# Patient Record
Sex: MALE | Race: OTHER | HISPANIC OR LATINO | URBAN - METROPOLITAN AREA
[De-identification: names, ages, dates, MRNs, and addresses within clinical notes are randomized per-mention and may not be internally consistent; named-entity substitution may affect disease eponyms.]

---

## 2017-04-21 ENCOUNTER — EMERGENCY (EMERGENCY)
Facility: HOSPITAL | Age: 62
LOS: 0 days | Discharge: HOME | End: 2017-04-21
Attending: EMERGENCY MEDICINE
Payer: MEDICARE

## 2017-04-21 VITALS
HEIGHT: 65 IN | TEMPERATURE: 98 F | RESPIRATION RATE: 20 BRPM | DIASTOLIC BLOOD PRESSURE: 81 MMHG | SYSTOLIC BLOOD PRESSURE: 134 MMHG | WEIGHT: 154.98 LBS | HEART RATE: 103 BPM | OXYGEN SATURATION: 100 %

## 2017-04-21 VITALS
DIASTOLIC BLOOD PRESSURE: 68 MMHG | HEART RATE: 82 BPM | RESPIRATION RATE: 19 BRPM | TEMPERATURE: 98 F | OXYGEN SATURATION: 100 % | SYSTOLIC BLOOD PRESSURE: 121 MMHG

## 2017-04-21 DIAGNOSIS — M10.9 GOUT, UNSPECIFIED: ICD-10-CM

## 2017-04-21 DIAGNOSIS — M79.673 PAIN IN UNSPECIFIED FOOT: ICD-10-CM

## 2017-04-21 PROCEDURE — 99283 EMERGENCY DEPT VISIT LOW MDM: CPT

## 2017-04-21 RX ADMIN — Medication 500 MILLIGRAM(S): at 12:53

## 2017-04-21 NOTE — ED ADULT NURSE NOTE - OBJECTIVE STATEMENT
patient c/o of R knee pain and L foot and L hand pain started 14 days ago , denied chest pain no difficulty breathing

## 2017-04-21 NOTE — ED PROVIDER NOTE - OBJECTIVE STATEMENT
Pt is a 60 yo gentleman with a pmhx of Pt is a 62 yo gentleman with a pmhx of gout, HTN, HL who presents to the ED with R. knee pain and L. 1st toe pain consistent w his usual gout pain. Started 2 weeks ago. No fevers, no trauama. Has swelling of the 2 joints. Has not been compliant with diet which he usually is, had a barbeque 2 weeks ago prior to start of pain.

## 2017-04-21 NOTE — ED ADULT TRIAGE NOTE - CHIEF COMPLAINT QUOTE
Swelling/pain to right knee, redness/swelling/pain to left ankle , pain to left fore arm . onset : "few weeks"  denies trauma

## 2018-08-01 ENCOUNTER — INPATIENT (INPATIENT)
Facility: HOSPITAL | Age: 63
LOS: 1 days | Discharge: HOME | End: 2018-08-03
Attending: INTERNAL MEDICINE | Admitting: INTERNAL MEDICINE
Payer: MEDICARE

## 2018-08-01 VITALS
HEIGHT: 66 IN | WEIGHT: 145.06 LBS | DIASTOLIC BLOOD PRESSURE: 70 MMHG | TEMPERATURE: 98 F | SYSTOLIC BLOOD PRESSURE: 148 MMHG | RESPIRATION RATE: 18 BRPM | OXYGEN SATURATION: 99 % | HEART RATE: 72 BPM

## 2018-08-01 DIAGNOSIS — I10 ESSENTIAL (PRIMARY) HYPERTENSION: ICD-10-CM

## 2018-08-01 DIAGNOSIS — L03.114 CELLULITIS OF LEFT UPPER LIMB: ICD-10-CM

## 2018-08-01 DIAGNOSIS — S49.90XA UNSPECIFIED INJURY OF SHOULDER AND UPPER ARM, UNSPECIFIED ARM, INITIAL ENCOUNTER: Chronic | ICD-10-CM

## 2018-08-01 DIAGNOSIS — E78.2 MIXED HYPERLIPIDEMIA: ICD-10-CM

## 2018-08-01 LAB
ALBUMIN SERPL ELPH-MCNC: 3.6 G/DL — SIGNIFICANT CHANGE UP (ref 3.3–5)
ALP SERPL-CCNC: 70 U/L — SIGNIFICANT CHANGE UP (ref 40–120)
ALT FLD-CCNC: 23 U/L — SIGNIFICANT CHANGE UP (ref 12–78)
ANION GAP SERPL CALC-SCNC: 10 MMOL/L — SIGNIFICANT CHANGE UP (ref 5–17)
AST SERPL-CCNC: 18 U/L — SIGNIFICANT CHANGE UP (ref 15–37)
BASOPHILS # BLD AUTO: 0.02 K/UL — SIGNIFICANT CHANGE UP (ref 0–0.2)
BASOPHILS NFR BLD AUTO: 0.2 % — SIGNIFICANT CHANGE UP (ref 0–2)
BILIRUB SERPL-MCNC: 0.7 MG/DL — SIGNIFICANT CHANGE UP (ref 0.2–1.2)
BUN SERPL-MCNC: 15 MG/DL — SIGNIFICANT CHANGE UP (ref 7–23)
CALCIUM SERPL-MCNC: 9 MG/DL — SIGNIFICANT CHANGE UP (ref 8.5–10.1)
CHLORIDE SERPL-SCNC: 103 MMOL/L — SIGNIFICANT CHANGE UP (ref 96–108)
CO2 SERPL-SCNC: 29 MMOL/L — SIGNIFICANT CHANGE UP (ref 22–31)
CREAT SERPL-MCNC: 1.2 MG/DL — SIGNIFICANT CHANGE UP (ref 0.5–1.3)
EOSINOPHIL # BLD AUTO: 0 K/UL — SIGNIFICANT CHANGE UP (ref 0–0.5)
EOSINOPHIL NFR BLD AUTO: 0 % — SIGNIFICANT CHANGE UP (ref 0–6)
ERYTHROCYTE [SEDIMENTATION RATE] IN BLOOD: 39 MM/HR — HIGH (ref 0–20)
GLUCOSE SERPL-MCNC: 109 MG/DL — HIGH (ref 70–99)
HCT VFR BLD CALC: 40.7 % — SIGNIFICANT CHANGE UP (ref 39–50)
HGB BLD-MCNC: 13.5 G/DL — SIGNIFICANT CHANGE UP (ref 13–17)
IMM GRANULOCYTES NFR BLD AUTO: 0.3 % — SIGNIFICANT CHANGE UP (ref 0–1.5)
LACTATE SERPL-SCNC: 1.8 MMOL/L — SIGNIFICANT CHANGE UP (ref 0.7–2)
LYMPHOCYTES # BLD AUTO: 1.82 K/UL — SIGNIFICANT CHANGE UP (ref 1–3.3)
LYMPHOCYTES # BLD AUTO: 14.7 % — SIGNIFICANT CHANGE UP (ref 13–44)
MAGNESIUM SERPL-MCNC: 2.6 MG/DL — SIGNIFICANT CHANGE UP (ref 1.6–2.6)
MCHC RBC-ENTMCNC: 30.9 PG — SIGNIFICANT CHANGE UP (ref 27–34)
MCHC RBC-ENTMCNC: 33.2 GM/DL — SIGNIFICANT CHANGE UP (ref 32–36)
MCV RBC AUTO: 93.1 FL — SIGNIFICANT CHANGE UP (ref 80–100)
MONOCYTES # BLD AUTO: 1.11 K/UL — HIGH (ref 0–0.9)
MONOCYTES NFR BLD AUTO: 9 % — SIGNIFICANT CHANGE UP (ref 2–14)
NEUTROPHILS # BLD AUTO: 9.37 K/UL — HIGH (ref 1.8–7.4)
NEUTROPHILS NFR BLD AUTO: 75.8 % — SIGNIFICANT CHANGE UP (ref 43–77)
NRBC # BLD: 0 /100 WBCS — SIGNIFICANT CHANGE UP (ref 0–0)
PLATELET # BLD AUTO: 227 K/UL — SIGNIFICANT CHANGE UP (ref 150–400)
POTASSIUM SERPL-MCNC: 3.6 MMOL/L — SIGNIFICANT CHANGE UP (ref 3.5–5.3)
POTASSIUM SERPL-SCNC: 3.6 MMOL/L — SIGNIFICANT CHANGE UP (ref 3.5–5.3)
PROT SERPL-MCNC: 8 GM/DL — SIGNIFICANT CHANGE UP (ref 6–8.3)
RBC # BLD: 4.37 M/UL — SIGNIFICANT CHANGE UP (ref 4.2–5.8)
RBC # FLD: 14.4 % — SIGNIFICANT CHANGE UP (ref 10.3–14.5)
SODIUM SERPL-SCNC: 142 MMOL/L — SIGNIFICANT CHANGE UP (ref 135–145)
WBC # BLD: 12.36 K/UL — HIGH (ref 3.8–10.5)
WBC # FLD AUTO: 12.36 K/UL — HIGH (ref 3.8–10.5)

## 2018-08-01 PROCEDURE — 73110 X-RAY EXAM OF WRIST: CPT | Mod: 26,LT

## 2018-08-01 PROCEDURE — 99223 1ST HOSP IP/OBS HIGH 75: CPT

## 2018-08-01 PROCEDURE — 73130 X-RAY EXAM OF HAND: CPT | Mod: 26,LT

## 2018-08-01 PROCEDURE — 71045 X-RAY EXAM CHEST 1 VIEW: CPT | Mod: 26

## 2018-08-01 PROCEDURE — 99285 EMERGENCY DEPT VISIT HI MDM: CPT

## 2018-08-01 RX ORDER — ATORVASTATIN CALCIUM 80 MG/1
0 TABLET, FILM COATED ORAL
Qty: 0 | Refills: 0 | COMMUNITY

## 2018-08-01 RX ORDER — AMPICILLIN SODIUM AND SULBACTAM SODIUM 250; 125 MG/ML; MG/ML
1.5 INJECTION, POWDER, FOR SUSPENSION INTRAMUSCULAR; INTRAVENOUS EVERY 6 HOURS
Qty: 0 | Refills: 0 | Status: DISCONTINUED | OUTPATIENT
Start: 2018-08-01 | End: 2018-08-03

## 2018-08-01 RX ORDER — AMPICILLIN SODIUM AND SULBACTAM SODIUM 250; 125 MG/ML; MG/ML
3 INJECTION, POWDER, FOR SUSPENSION INTRAMUSCULAR; INTRAVENOUS ONCE
Qty: 0 | Refills: 0 | Status: COMPLETED | OUTPATIENT
Start: 2018-08-01 | End: 2018-08-01

## 2018-08-01 RX ORDER — MORPHINE SULFATE 50 MG/1
4 CAPSULE, EXTENDED RELEASE ORAL ONCE
Qty: 0 | Refills: 0 | Status: DISCONTINUED | OUTPATIENT
Start: 2018-08-01 | End: 2018-08-01

## 2018-08-01 RX ORDER — MORPHINE SULFATE 50 MG/1
2 CAPSULE, EXTENDED RELEASE ORAL EVERY 4 HOURS
Qty: 0 | Refills: 0 | Status: DISCONTINUED | OUTPATIENT
Start: 2018-08-01 | End: 2018-08-03

## 2018-08-01 RX ORDER — ATORVASTATIN CALCIUM 80 MG/1
40 TABLET, FILM COATED ORAL AT BEDTIME
Qty: 0 | Refills: 0 | Status: DISCONTINUED | OUTPATIENT
Start: 2018-08-01 | End: 2018-08-03

## 2018-08-01 RX ORDER — HYDROCHLOROTHIAZIDE 25 MG
12.5 TABLET ORAL DAILY
Qty: 0 | Refills: 0 | Status: DISCONTINUED | OUTPATIENT
Start: 2018-08-01 | End: 2018-08-03

## 2018-08-01 RX ADMIN — MORPHINE SULFATE 4 MILLIGRAM(S): 50 CAPSULE, EXTENDED RELEASE ORAL at 16:58

## 2018-08-01 RX ADMIN — ATORVASTATIN CALCIUM 40 MILLIGRAM(S): 80 TABLET, FILM COATED ORAL at 22:53

## 2018-08-01 RX ADMIN — AMPICILLIN SODIUM AND SULBACTAM SODIUM 100 GRAM(S): 250; 125 INJECTION, POWDER, FOR SUSPENSION INTRAMUSCULAR; INTRAVENOUS at 22:53

## 2018-08-01 RX ADMIN — AMPICILLIN SODIUM AND SULBACTAM SODIUM 3 GRAM(S): 250; 125 INJECTION, POWDER, FOR SUSPENSION INTRAMUSCULAR; INTRAVENOUS at 16:58

## 2018-08-01 RX ADMIN — MORPHINE SULFATE 4 MILLIGRAM(S): 50 CAPSULE, EXTENDED RELEASE ORAL at 16:27

## 2018-08-01 RX ADMIN — AMPICILLIN SODIUM AND SULBACTAM SODIUM 200 GRAM(S): 250; 125 INJECTION, POWDER, FOR SUSPENSION INTRAMUSCULAR; INTRAVENOUS at 16:28

## 2018-08-01 NOTE — ED ADULT TRIAGE NOTE - CHIEF COMPLAINT QUOTE
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 denies trauma.

## 2018-08-01 NOTE — H&P ADULT - ASSESSMENT
62f with history of recent orif with pain       IMPROVE VTE Individual Risk Assessment          RISK                                                          Points    [  ] Previous VTE                                                3    [  ] Thrombophilia                                             2    [  ] Lower limb paralysis                                    2        (unable to hold up >15 seconds)      [  ] Current Cancer                                             2         (within 6 months)    [  ] Immobilization > 24 hrs                              1    [  ] ICU/CCU stay > 24 hours                            1    [  ] Age > 60                                                    1    IMPROVE VTE Score ______0___

## 2018-08-01 NOTE — H&P ADULT - NSHPREVIEWOFSYSTEMS_GEN_ALL_CORE

## 2018-08-01 NOTE — ED PROVIDER NOTE - MEDICAL DECISION MAKING DETAILS
left hand and wrist cellulitis. Will start on IV abx and admit for resolution of infection. No signs of sepsis at this time.

## 2018-08-01 NOTE — H&P ADULT - NSHPPHYSICALEXAM_GEN_ALL_CORE
GENERAL: NAD well-developed  HEAD:  Atraumatic, Normocephalic  EYES: EOMI, PERRLA, conjunctiva and sclera clear  ENMT: No tonsillar erythema, exudates, or enlargement; Moist mucous membranes, Good dentition, No lesions  NECK: Supple, No JVD, Normal thyroid  NERVOUS SYSTEM:  Alert & Oriented X3, Good concentration; Motor Strength 5/5 B/L upper and lower extremities; DTRs 2+ intact and symmetric  CHEST/LUNG: Clear to percussion bilaterally; No rales, rhonchi, wheezing, or rubs  HEART: Regular rate and rhythm; No murmurs, rubs, or gallops  ABDOMEN: Soft, Nontender, Nondistended; Bowel sounds present  EXTREMITIES:  2+ Peripheral Pulses, No clubbing, cyanosis, or edema  LYMPH: No lymphadenopathy   SKIN: No rashes or lesions GENERAL: NAD well-developed  HEAD:  Atraumatic, Normocephalic  EYES: EOMI, PERRLA, conjunctiva and sclera clear  ENMT: No tonsillar erythema, exudates, or enlargement; Moist mucous membranes, Good dentition, No lesions  NECK: Supple, No JVD, Normal thyroid  NERVOUS SYSTEM:  Alert & Oriented X3, Good concentration; Motor Strength 5/5 B/L upper and lower extremities; DTRs 2+ intact and symmetric  CHEST/LUNG: Clear to percussion bilaterally; No rales, rhonchi, wheezing, or rubs  HEART: Regular rate and rhythm; No murmurs, rubs, or gallops  ABDOMEN: Soft, Nontender, Nondistended; Bowel sounds present  EXTREMITIES:  left hand erythema swelling and warmth to left hand with decreased range of motion status post left wrist orif   LYMPH: No lymphadenopathy   SKIN: No rashes or lesions

## 2018-08-01 NOTE — H&P ADULT - HISTORY OF PRESENT ILLNESS
62m hx of left wrist ORIF 6 years ago and htn pw 2 days of left wrist and hand pain with associated swelling. Patient denies trauma, nausea, vomiting, fever or chills. He did not take anything for symptoms but they gradually worsened over the past 2 days. He went to an urgent care and was told to come in for xrays. Patient has no cp, sob, rash, bleeding, abd pain, ha, vision change, or numbness

## 2018-08-01 NOTE — ED ADULT NURSE NOTE - NSIMPLEMENTINTERV_GEN_ALL_ED
Implemented All Universal Safety Interventions:  [place] [employer] to call system. Call bell, personal items and telephone within reach. Instruct patient to call for assistance. Room bathroom lighting operational. Non-slip footwear when patient is off stretcher. Physically safe environment: no spills, clutter or unnecessary equipment. Stretcher in lowest position, wheels locked, appropriate side rails in place.

## 2018-08-01 NOTE — ED PROVIDER NOTE - OBJECTIVE STATEMENT
Pertinent PMH/PSH/FHx/SHx and Review of Systems contained within:  62m hx of left wrist ORIF 6 years ago and htn pw 2 days of left wrist and hand pain with associated swelling. Patient denies trauma, nausea, vomiting, fever or chills. He did not take anything for symptoms but they gradually worsened over the past 2 days. He went to an urgent care and was told to come in for xrays. Patient has no cp, sob, rash, bleeding, abd pain, ha, vision change, or numbness  Fh and Sh not otherwise contributory  ROS otherwise negative

## 2018-08-01 NOTE — H&P ADULT - NSHPLABSRESULTS_GEN_ALL_CORE
13.5   12.36 )-----------( 227      ( 01 Aug 2018 16:23 )             40.7   08-01    142  |  103  |  15  ----------------------------<  109<H>  3.6   |  29  |  1.20    Ca    9.0      01 Aug 2018 16:23  Mg     2.6     08-01    TPro  8.0  /  Alb  3.6  /  TBili  0.7  /  DBili  x   /  AST  18  /  ALT  23  /  AlkPhos  70  08-01  < from: Xray Hand 3 Views, Left (08.01.18 @ 16:21) >      IMPRESSION: No acute finding. Other findings as above.    < from: Xray Chest 1 View AP/PA. (08.01.18 @ 17:19) >    IMPRESSION: Negative ches    < from: Xray Wrist 3 Views, Left (08.01.18 @ 16:21) >    here is a small orthopedic staple seen around the dorsal navicular area   which is new since November 30, 2012. The 2012 study showed some slight   scapholunate widening which is unchanged.    No bone destruction or acute fracture is evident.    < end of copied text >

## 2018-08-01 NOTE — ED PROVIDER NOTE - PHYSICAL EXAMINATION
Gen: Alert, NAD  Head: NC, AT   Eyes: PERRL, EOMI, normal lids/conjunctiva  ENT: normal hearing, patent oropharynx without erythema/exudate, uvula midline  Neck: supple, no tenderness, Trachea midline  Pulm: Bilateral BS, normal resp effort, no wheeze/stridor/retractions  CV: RRR, no M/R/G, 2+ radial and dp pulses bl  Abd: soft, NT/ND, +BS, no hepatosplenomegaly  Mskel: left hand soft tissue swelling with erythema extending from distal forearm to mid hand. it is tender to palpation and there is pain with ranging   Skin: erythema of the left hand and wrist  Neuro: AAOx3, no sensory/motor deficits, CN 2-12 intact

## 2018-08-02 LAB
CRP SERPL-MCNC: 19.81 MG/DL — HIGH (ref 0–0.4)
URATE SERPL-MCNC: 6.6 MG/DL — SIGNIFICANT CHANGE UP (ref 3.4–8.8)

## 2018-08-02 PROCEDURE — 99223 1ST HOSP IP/OBS HIGH 75: CPT

## 2018-08-02 PROCEDURE — 73610 X-RAY EXAM OF ANKLE: CPT | Mod: 26,LT

## 2018-08-02 PROCEDURE — 93010 ELECTROCARDIOGRAM REPORT: CPT

## 2018-08-02 PROCEDURE — 99233 SBSQ HOSP IP/OBS HIGH 50: CPT

## 2018-08-02 RX ORDER — POLYETHYLENE GLYCOL 3350 17 G/17G
17 POWDER, FOR SOLUTION ORAL DAILY
Qty: 0 | Refills: 0 | Status: DISCONTINUED | OUTPATIENT
Start: 2018-08-02 | End: 2018-08-03

## 2018-08-02 RX ORDER — DOCUSATE SODIUM 100 MG
100 CAPSULE ORAL
Qty: 0 | Refills: 0 | Status: DISCONTINUED | OUTPATIENT
Start: 2018-08-02 | End: 2018-08-03

## 2018-08-02 RX ORDER — COLCHICINE 0.6 MG
1.2 TABLET ORAL ONCE
Qty: 0 | Refills: 0 | Status: COMPLETED | OUTPATIENT
Start: 2018-08-02 | End: 2018-08-02

## 2018-08-02 RX ORDER — SENNA PLUS 8.6 MG/1
2 TABLET ORAL AT BEDTIME
Qty: 0 | Refills: 0 | Status: DISCONTINUED | OUTPATIENT
Start: 2018-08-02 | End: 2018-08-03

## 2018-08-02 RX ORDER — ACETAMINOPHEN 500 MG
650 TABLET ORAL EVERY 6 HOURS
Qty: 0 | Refills: 0 | Status: DISCONTINUED | OUTPATIENT
Start: 2018-08-02 | End: 2018-08-03

## 2018-08-02 RX ADMIN — AMPICILLIN SODIUM AND SULBACTAM SODIUM 100 GRAM(S): 250; 125 INJECTION, POWDER, FOR SUSPENSION INTRAMUSCULAR; INTRAVENOUS at 10:51

## 2018-08-02 RX ADMIN — Medication 650 MILLIGRAM(S): at 18:09

## 2018-08-02 RX ADMIN — Medication 12.5 MILLIGRAM(S): at 06:02

## 2018-08-02 RX ADMIN — SENNA PLUS 2 TABLET(S): 8.6 TABLET ORAL at 22:11

## 2018-08-02 RX ADMIN — AMPICILLIN SODIUM AND SULBACTAM SODIUM 100 GRAM(S): 250; 125 INJECTION, POWDER, FOR SUSPENSION INTRAMUSCULAR; INTRAVENOUS at 16:30

## 2018-08-02 RX ADMIN — AMPICILLIN SODIUM AND SULBACTAM SODIUM 100 GRAM(S): 250; 125 INJECTION, POWDER, FOR SUSPENSION INTRAMUSCULAR; INTRAVENOUS at 06:02

## 2018-08-02 RX ADMIN — Medication 1.2 MILLIGRAM(S): at 16:28

## 2018-08-02 RX ADMIN — AMPICILLIN SODIUM AND SULBACTAM SODIUM 100 GRAM(S): 250; 125 INJECTION, POWDER, FOR SUSPENSION INTRAMUSCULAR; INTRAVENOUS at 22:12

## 2018-08-02 RX ADMIN — MORPHINE SULFATE 2 MILLIGRAM(S): 50 CAPSULE, EXTENDED RELEASE ORAL at 11:25

## 2018-08-02 RX ADMIN — Medication 60 MILLIGRAM(S): at 16:28

## 2018-08-02 RX ADMIN — Medication 100 MILLIGRAM(S): at 17:38

## 2018-08-02 RX ADMIN — ATORVASTATIN CALCIUM 40 MILLIGRAM(S): 80 TABLET, FILM COATED ORAL at 22:11

## 2018-08-02 RX ADMIN — MORPHINE SULFATE 2 MILLIGRAM(S): 50 CAPSULE, EXTENDED RELEASE ORAL at 11:10

## 2018-08-02 NOTE — PROGRESS NOTE ADULT - SUBJECTIVE AND OBJECTIVE BOX
Patient is a 62y old  Male who presents with a chief complaint of left wrist pain (01 Aug 2018 18:10)      INTERVAL HPI/OVERNIGHT EVENTS: no events     MEDICATIONS  (STANDING):  ampicillin/sulbactam  IVPB 1.5 Gram(s) IV Intermittent every 6 hours  atorvastatin 40 milliGRAM(s) Oral at bedtime  hydrochlorothiazide 12.5 milliGRAM(s) Oral daily    MEDICATIONS  (PRN):  morphine  - Injectable 2 milliGRAM(s) IV Push every 4 hours PRN Severe Pain (7 - 10)  naproxen 500 milliGRAM(s) Oral two times a day PRN pain      Allergies    No Known Allergies    Intolerances             Vital Signs Last 24 Hrs  T(C): 37 (02 Aug 2018 05:23), Max: 37.5 (01 Aug 2018 22:45)  T(F): 98.6 (02 Aug 2018 05:23), Max: 99.5 (01 Aug 2018 22:45)  HR: 61 (02 Aug 2018 05:23) (61 - 72)  BP: 130/56 (02 Aug 2018 05:23) (130/56 - 153/66)  BP(mean): --  RR: 16 (02 Aug 2018 05:23) (16 - 18)  SpO2: 100% (02 Aug 2018 05:23) (99% - 100%)    PHYSICAL EXAM:  GENERAL: NAD well-developed  	HEAD:  Atraumatic, Normocephalic  	EYES: EOMI, PERRLA, conjunctiva and sclera clear  	ENMT: No tonsillar erythema, exudates, or enlargement; Moist mucous membranes, Good dentition, No lesions  	NECK: Supple, No JVD, Normal thyroid  	NERVOUS SYSTEM:  Alert & Oriented X3, Good concentration; Motor Strength 5/5 B/L upper and lower extremities; DTRs 2+ intact and symmetric  	CHEST/LUNG: Clear to percussion bilaterally; No rales, rhonchi, wheezing, or rubs  	HEART: Regular rate and rhythm; No murmurs, rubs, or gallops  	ABDOMEN: Soft, Nontender, Nondistended; Bowel sounds present  	EXTREMITIES:  left hand erythema swelling and warmth to left hand with decreased range of motion status post left wrist orif  Edematous, Swelling of left ankle as well   	LYMPH: No lymphadenopathy       LABS:                        13.5   12.36 )-----------( 227      ( 01 Aug 2018 16:23 )             40.7     08-01    142  |  103  |  15  ----------------------------<  109<H>  3.6   |  29  |  1.20    Ca    9.0      01 Aug 2018 16:23  Mg     2.6     08-01    TPro  8.0  /  Alb  3.6  /  TBili  0.7  /  DBili  x   /  AST  18  /  ALT  23  /  AlkPhos  70  08-01        CAPILLARY BLOOD GLUCOSE          RADIOLOGY & ADDITIONAL TESTS:    Imaging Personally Reviewed:  [ ] YES  [ ] NO    Consultant(s) Notes Reviewed:  [ ] YES  [ ] NO    Care Discussed with Consultants/Other Providers [ ] YES  [ ] NO

## 2018-08-02 NOTE — CONSULT NOTE ADULT - SUBJECTIVE AND OBJECTIVE BOX
History:  62yMale  PMH below with complaint of Left Hand pain and swelling for the past 3 days and mild left ankle pain  with no known trauma or injury to either. Patient reports a history of gout in his toes and knees. Denies nausea, vomiting, chest pain, shortness of breath, abdominal pain, chills or fever. No new complaints. No acute motor or sensory changes are reported. Pt is R hand dominant.     PMH: HTN, HLD, Gout  PSH: L hand Ligament repair    Vital Signs Last 24 Hrs  T(C): 37 (02 Aug 2018 05:23), Max: 37.5 (01 Aug 2018 22:45)  T(F): 98.6 (02 Aug 2018 05:23), Max: 99.5 (01 Aug 2018 22:45)  HR: 61 (02 Aug 2018 05:23) (61 - 72)  BP: 130/56 (02 Aug 2018 05:23) (130/56 - 153/66)  BP(mean): --  RR: 16 (02 Aug 2018 05:23) (16 - 18)  SpO2: 100% (02 Aug 2018 05:23) (99% - 100%)                          13.5   12.36 )-----------( 227      ( 01 Aug 2018 16:23 )             40.7     08-01    142  |  103  |  15  ----------------------------<  109<H>  3.6   |  29  |  1.20    Ca    9.0      01 Aug 2018 16:23  Mg     2.6     08-01    TPro  8.0  /  Alb  3.6  /  TBili  0.7  /  DBili  x   /  AST  18  /  ALT  23  /  AlkPhos  70  08-01      MEDICATIONS  (STANDING):  ampicillin/sulbactam  IVPB 1.5 Gram(s) IV Intermittent every 6 hours  atorvastatin 40 milliGRAM(s) Oral at bedtime  hydrochlorothiazide 12.5 milliGRAM(s) Oral daily    MEDICATIONS  (PRN):  morphine  - Injectable 2 milliGRAM(s) IV Push every 4 hours PRN Severe Pain (7 - 10)  naproxen 500 milliGRAM(s) Oral two times a day PRN pain      Physical exam:   NAD Alert Awake, Follows commands. Nonseptic appearing.  There is erythema/swelling of the Left Hand diffusely with no palpable areas of fluctuance. The skin is intact. No open skin. There is mild global tenderness of the affected area. No drainage or discharge. No proximal streaking or spreading is noted. Compartments are soft. +AIN/PIN/U/M/R, No pain with PROM in midrange of motion or with micromotion, +Pain with end dorsiflexion and volar flexion, SILT C5-T1,  2+ Radial pulse. Capillary refill brisk less than 2 seconds. No cyanosis.    Left Lower Extremity: Skin intact, no erythema, no warmth, mild +TTP distal to lateral malleolus, small amount of soft tissue edema noted, no fluctuance, +EHL/FHL/TA/GS, No pain with full PROM of ankle and subtalar joint, SILT L3-S1, +DP/PT Pulses, Compartments soft, No calf TTP B/L    Secondary Survey: Full ROM of unaffected extremities, able to SLR B/L, SILT Globally, compartments soft, no bony TTP over bony prominences, no calf TTP B/L, no TTP along axial spine. History:  62yMale  PMH below with complaint of Left Hand pain and swelling for the past 3 days and mild left ankle pain  with no known trauma or injury to either. Patient reports a history of gout in his toes and knees. The patient reports that the pain and swelling in his left hand has improved since he has been in the hospital. Denies nausea, vomiting, chest pain, shortness of breath, abdominal pain, chills or fever. No new complaints. No acute motor or sensory changes are reported. Pt is R hand dominant.     PMH: HTN, HLD, Gout  PSH: L hand Ligament repair    Vital Signs Last 24 Hrs  T(C): 37 (02 Aug 2018 05:23), Max: 37.5 (01 Aug 2018 22:45)  T(F): 98.6 (02 Aug 2018 05:23), Max: 99.5 (01 Aug 2018 22:45)  HR: 61 (02 Aug 2018 05:23) (61 - 72)  BP: 130/56 (02 Aug 2018 05:23) (130/56 - 153/66)  BP(mean): --  RR: 16 (02 Aug 2018 05:23) (16 - 18)  SpO2: 100% (02 Aug 2018 05:23) (99% - 100%)                          13.5   12.36 )-----------( 227      ( 01 Aug 2018 16:23 )             40.7     08-01    142  |  103  |  15  ----------------------------<  109<H>  3.6   |  29  |  1.20    Ca    9.0      01 Aug 2018 16:23  Mg     2.6     08-01    TPro  8.0  /  Alb  3.6  /  TBili  0.7  /  DBili  x   /  AST  18  /  ALT  23  /  AlkPhos  70  08-01      MEDICATIONS  (STANDING):  ampicillin/sulbactam  IVPB 1.5 Gram(s) IV Intermittent every 6 hours  atorvastatin 40 milliGRAM(s) Oral at bedtime  hydrochlorothiazide 12.5 milliGRAM(s) Oral daily    MEDICATIONS  (PRN):  morphine  - Injectable 2 milliGRAM(s) IV Push every 4 hours PRN Severe Pain (7 - 10)  naproxen 500 milliGRAM(s) Oral two times a day PRN pain      Physical exam:   NAD Alert Awake, Follows commands. Nonseptic appearing.  There is erythema/swelling of the Left Hand diffusely with no palpable areas of fluctuance. The skin is intact. No open skin. There is mild global tenderness of the affected area. No drainage or discharge. No proximal streaking or spreading is noted. Compartments are soft. +AIN/PIN/U/M/R, No pain with PROM in midrange of motion or with micromotion, +Pain with end dorsiflexion and volar flexion, SILT C5-T1,  2+ Radial pulse. Capillary refill brisk less than 2 seconds. No cyanosis.    Left Lower Extremity: Skin intact, no erythema, no warmth, mild +TTP distal to lateral malleolus, small amount of soft tissue edema noted, no fluctuance, +EHL/FHL/TA/GS, No pain with full PROM of ankle and subtalar joint, SILT L3-S1, +DP/PT Pulses, Compartments soft, No calf TTP B/L    Secondary Survey: Full ROM of unaffected extremities, able to SLR B/L, SILT Globally, compartments soft, no bony TTP over bony prominences, no calf TTP B/L, no TTP along axial spine.

## 2018-08-02 NOTE — CONSULT NOTE ADULT - ASSESSMENT
A/P: 62M w/ Cellulitis of left hand, possible gout flare of left ankle     Plan: No clinical concern for septic arthritis at this time  -Continue IV antibiotics for cellulitis  -Elevation of the affected extremity, compression with ACE, heat  - Analgesia, nsaids as tolerated/ colchicine   - DVT PE ppx  - FU XR of left ankle  -No orthopedic surgical or procedural intervention planned at this time  - Will discuss with Dr. Licona and advise if plan changes A/P: 62M w/ Cellulitis of left hand, possible gout flare of left ankle     Plan: No clinical concern for septic arthritis at this time  - XR L Ankle demonstrates OA, no Fx or Dsx  -Continue IV antibiotics for cellulitis  -Elevation of the affected extremity, compression with ACE, heat  - Analgesia, nsaids as tolerated/ colchicine   - DVT PE ppx  -No orthopedic surgical or procedural intervention planned at this time  - Please FU with Dr Licona as outpatient, please call office for appointment  - Ortho Stable  - Will discuss with Dr. Licona and advise if plan changes

## 2018-08-02 NOTE — CONSULT NOTE ADULT - ATTENDING COMMENTS
hand swelling likely gout flare  cont antibiotics today   add steroids  f/u on blood c/s
pt states hit wrist against shower door 3 days ago at home with swelling wrist.  no sign of septic wrist

## 2018-08-02 NOTE — CONSULT NOTE ADULT - SUBJECTIVE AND OBJECTIVE BOX
Infectious Diseases - Attending at Dr. Zuluaga    HPI:  62m hx of left wrist ORIF 6 years ago and htn pw 2 days of left wrist and hand pain with associated swelling. Patient denies trauma, nausea, vomiting, fever or chills. He did not take anything for symptoms but they gradually worsened over the past 2 days. He went to an urgent care and was told to come in for xrays. Patient has no cp, sob, rash, bleeding, abd pain, ha, vision change, or numbness (01 Aug 2018 18:10)      PAST MEDICAL & SURGICAL HISTORY:  Mixed hyperlipidemia  HTN (hypertension)  Arm injury: left arm surgery      Allergies    No Known Allergies    Intolerances        FAMILY HISTORY:      SOCIAL HISTORY:    REVIEW OF SYSTEMS:    Constitutional: No fever, weight loss or fatigue  Eyes: No eye pain, visual disturbances, or discharge  ENT:  No difficulty hearing, tinnitus, vertigo; No sinus or throat pain  Neck: No pain or stiffness  Respiratory: No cough, wheezing, chills or hemoptysis  Cardiovascular: No chest pain, palpitations, shortness of breath, dizziness or leg swelling  Gastrointestinal: No abdominal or epigastric pain. No nausea, vomiting or hematemesis; No diarrhea or constipation. No melena or hematochezia.  Genitourinary: No dysuria, frequency, hematuria or incontinence  Neurological: No headaches, memory loss, loss of strength, numbness or tremors  Skin: No itching, burning, rashes or lesions       MEDICATIONS  (STANDING):  ampicillin/sulbactam  IVPB 1.5 Gram(s) IV Intermittent every 6 hours  atorvastatin 40 milliGRAM(s) Oral at bedtime  docusate sodium 100 milliGRAM(s) Oral two times a day  hydrochlorothiazide 12.5 milliGRAM(s) Oral daily  senna 2 Tablet(s) Oral at bedtime    MEDICATIONS  (PRN):  acetaminophen   Tablet 650 milliGRAM(s) Oral every 6 hours PRN For Temp greater than 38 C (100.4 F)  morphine  - Injectable 2 milliGRAM(s) IV Push every 4 hours PRN Severe Pain (7 - 10)  naproxen 500 milliGRAM(s) Oral two times a day PRN pain  polyethylene glycol 3350 17 Gram(s) Oral daily PRN Constipation      Vital Signs Last 24 Hrs  T(C): 38.2 (02 Aug 2018 17:08), Max: 38.2 (02 Aug 2018 17:08)  T(F): 100.8 (02 Aug 2018 17:08), Max: 100.8 (02 Aug 2018 17:08)  HR: 65 (02 Aug 2018 17:08) (61 - 66)  BP: 141/67 (02 Aug 2018 17:08) (130/56 - 141/67)  BP(mean): --  RR: 17 (02 Aug 2018 17:08) (16 - 17)  SpO2: 100% (02 Aug 2018 17:08) (100% - 100%)    PHYSICAL EXAM:    Constitutional: NAD, well-groomed, well-developed  HEENT: PERRLA, EOMI, Normal Hearing, MMM  Neck: No LAD, No JVD  Back: Normal spine flexure, No CVA tenderness  Respiratory: CTAB/L  Cardiovascular: S1 and S2, RRR, no M/G/R  Gastrointestinal: BS+, soft, NT/ND  Extremities: No peripheral edema  Vascular: 2+ peripheral pulses  Neurological: A/O x 3, no focal deficits  Skin: No rashes      LABS:                        13.5   12.36 )-----------( 227      ( 01 Aug 2018 16:23 )             40.7     08-01    142  |  103  |  15  ----------------------------<  109<H>  3.6   |  29  |  1.20    Ca    9.0      01 Aug 2018 16:23  Mg     2.6     08-01    TPro  8.0  /  Alb  3.6  /  TBili  0.7  /  DBili  x   /  AST  18  /  ALT  23  /  AlkPhos  70  08-01          MICROBIOLOGY:  RECENT CULTURES:        RADIOLOGY & ADDITIONAL STUDIES:

## 2018-08-03 ENCOUNTER — TRANSCRIPTION ENCOUNTER (OUTPATIENT)
Age: 63
End: 2018-08-03

## 2018-08-03 VITALS
OXYGEN SATURATION: 100 % | TEMPERATURE: 98 F | WEIGHT: 143.96 LBS | SYSTOLIC BLOOD PRESSURE: 122 MMHG | RESPIRATION RATE: 17 BRPM | HEART RATE: 62 BPM | DIASTOLIC BLOOD PRESSURE: 70 MMHG

## 2018-08-03 LAB
ANION GAP SERPL CALC-SCNC: 10 MMOL/L — SIGNIFICANT CHANGE UP (ref 5–17)
BUN SERPL-MCNC: 15 MG/DL — SIGNIFICANT CHANGE UP (ref 7–23)
CALCIUM SERPL-MCNC: 9.1 MG/DL — SIGNIFICANT CHANGE UP (ref 8.5–10.1)
CHLORIDE SERPL-SCNC: 100 MMOL/L — SIGNIFICANT CHANGE UP (ref 96–108)
CO2 SERPL-SCNC: 28 MMOL/L — SIGNIFICANT CHANGE UP (ref 22–31)
CREAT SERPL-MCNC: 0.84 MG/DL — SIGNIFICANT CHANGE UP (ref 0.5–1.3)
GLUCOSE SERPL-MCNC: 142 MG/DL — HIGH (ref 70–99)
HCT VFR BLD CALC: 40.3 % — SIGNIFICANT CHANGE UP (ref 39–50)
HGB BLD-MCNC: 13.5 G/DL — SIGNIFICANT CHANGE UP (ref 13–17)
MCHC RBC-ENTMCNC: 30.6 PG — SIGNIFICANT CHANGE UP (ref 27–34)
MCHC RBC-ENTMCNC: 33.5 GM/DL — SIGNIFICANT CHANGE UP (ref 32–36)
MCV RBC AUTO: 91.4 FL — SIGNIFICANT CHANGE UP (ref 80–100)
NRBC # BLD: 0 /100 WBCS — SIGNIFICANT CHANGE UP (ref 0–0)
PLATELET # BLD AUTO: 231 K/UL — SIGNIFICANT CHANGE UP (ref 150–400)
POTASSIUM SERPL-MCNC: 3.7 MMOL/L — SIGNIFICANT CHANGE UP (ref 3.5–5.3)
POTASSIUM SERPL-SCNC: 3.7 MMOL/L — SIGNIFICANT CHANGE UP (ref 3.5–5.3)
RBC # BLD: 4.41 M/UL — SIGNIFICANT CHANGE UP (ref 4.2–5.8)
RBC # FLD: 13.6 % — SIGNIFICANT CHANGE UP (ref 10.3–14.5)
SODIUM SERPL-SCNC: 138 MMOL/L — SIGNIFICANT CHANGE UP (ref 135–145)
WBC # BLD: 11.83 K/UL — HIGH (ref 3.8–10.5)
WBC # FLD AUTO: 11.83 K/UL — HIGH (ref 3.8–10.5)

## 2018-08-03 PROCEDURE — 99239 HOSP IP/OBS DSCHRG MGMT >30: CPT

## 2018-08-03 RX ORDER — FELODIPINE 5 MG/1
1 TABLET, FILM COATED, EXTENDED RELEASE ORAL
Qty: 30 | Refills: 0 | OUTPATIENT
Start: 2018-08-03 | End: 2018-09-01

## 2018-08-03 RX ORDER — COLCHICINE 0.6 MG
1 TABLET ORAL
Qty: 7 | Refills: 0 | OUTPATIENT
Start: 2018-08-03 | End: 2018-08-09

## 2018-08-03 RX ORDER — COLCHICINE 0.6 MG
0.6 TABLET ORAL ONCE
Qty: 0 | Refills: 0 | Status: COMPLETED | OUTPATIENT
Start: 2018-08-03 | End: 2018-08-03

## 2018-08-03 RX ADMIN — Medication 20 MILLIGRAM(S): at 09:19

## 2018-08-03 RX ADMIN — Medication 12.5 MILLIGRAM(S): at 05:50

## 2018-08-03 RX ADMIN — AMPICILLIN SODIUM AND SULBACTAM SODIUM 100 GRAM(S): 250; 125 INJECTION, POWDER, FOR SUSPENSION INTRAMUSCULAR; INTRAVENOUS at 04:30

## 2018-08-03 RX ADMIN — Medication 100 MILLIGRAM(S): at 05:50

## 2018-08-03 RX ADMIN — Medication 0.6 MILLIGRAM(S): at 09:19

## 2018-08-03 NOTE — DISCHARGE NOTE ADULT - MEDICATION SUMMARY - MEDICATIONS TO TAKE
I will START or STAY ON the medications listed below when I get home from the hospital:    Naprosyn 500 mg oral tablet  -- 1 tab(s) by mouth 2 times a day  -- Check with your doctor before becoming pregnant.  May cause drowsiness or dizziness.  Obtain medical advice before taking any non-prescription drugs as some may affect the action of this medication.  Take with food or milk.    -- Indication: For pain    colchicine 0.6 mg oral tablet  -- 1 tab(s) by mouth once  -- Indication: For gout    atorvastatin 40 mg oral tablet  --  by mouth   -- Indication: For Hld    felodipine 5 mg oral tablet, extended release  -- 1 tab(s) by mouth once a day   -- Avoid grapefruit and grapefruit juice while taking this medication.  It is very important that you take or use this exactly as directed.  Do not skip doses or discontinue unless directed by your doctor.  Some non-prescription drugs may aggravate your condition.  Read all labels carefully.  If a warning appears, check with your doctor before taking.  Swallow whole.  Do not crush.    -- Indication: For Essential hypertension

## 2018-08-03 NOTE — DISCHARGE NOTE ADULT - PATIENT PORTAL LINK FT
You can access the [facility] Patient Portal, offered by [facility], by registering with the following website: [email or web address]

## 2018-08-03 NOTE — DISCHARGE NOTE ADULT - CARE PLAN
Principal Discharge DX:	Acute gout of multiple sites, unspecified cause  Goal:	continue with colchicine  Assessment and plan of treatment:	f/u with your pcp  Secondary Diagnosis:	Mixed hyperlipidemia  Secondary Diagnosis:	Essential hypertension  Goal:	Stop hydrochlorothiazide as it can make your gout worst  Assessment and plan of treatment:	Start felodipine

## 2018-08-03 NOTE — DISCHARGE NOTE ADULT - PLAN OF CARE
continue with colchicine f/u with your pcp Stop hydrochlorothiazide as it can make your gout worst Start felodipine

## 2018-08-03 NOTE — DISCHARGE NOTE ADULT - MEDICATION SUMMARY - MEDICATIONS TO STOP TAKING
I will STOP taking the medications listed below when I get home from the hospital:    hydroCHLOROthiazide 12.5 mg oral tablet  --  by mouth

## 2018-08-03 NOTE — DISCHARGE NOTE ADULT - HOSPITAL COURSE
Assessment and Plan:   · Assessment		  62f with history of  orif with pain         Problem/Plan - 1:  ·  Problem: Hand/Ankle swelling   Plan:responded well to colchicine and steroids  less likely from infection and more likely from acute gout attack  colchicine and f/u with pcp     Problem/Plan - 2:  ·  Problem: Essential hypertension.  Plan: stop hctz, start ccb     Problem/Plan - 3:  ·  Problem: Mixed hyperlipidemia.  Plan: continue home meds.     Attending Attestation:   I was physically present for the key portions of the evaluation and management (E/M) service provided.  I agree with the above history, physical, and plan which I have reviewed and edited where appropriate.     45 minutes spent on total dc  encounter; more than 50% of the visit was spent counseling patient on gout and diet and/or coordinating care by the attending physician.

## 2018-08-07 LAB
CULTURE RESULTS: SIGNIFICANT CHANGE UP
CULTURE RESULTS: SIGNIFICANT CHANGE UP
SPECIMEN SOURCE: SIGNIFICANT CHANGE UP
SPECIMEN SOURCE: SIGNIFICANT CHANGE UP

## 2018-08-08 DIAGNOSIS — L03.114 CELLULITIS OF LEFT UPPER LIMB: ICD-10-CM

## 2018-08-08 DIAGNOSIS — I10 ESSENTIAL (PRIMARY) HYPERTENSION: ICD-10-CM

## 2018-08-08 DIAGNOSIS — E78.2 MIXED HYPERLIPIDEMIA: ICD-10-CM

## 2018-08-08 DIAGNOSIS — M10.072 IDIOPATHIC GOUT, LEFT ANKLE AND FOOT: ICD-10-CM

## 2018-10-31 NOTE — PATIENT PROFILE ADULT. - AS SC BRADEN FRICTION
(3) no apparent problem Trilobed Flap Text: The defect edges were debeveled with a #15 scalpel blade.  Given the location of the defect and the proximity to free margins a trilobed flap was deemed most appropriate.  Using a sterile surgical marker, an appropriate trilobed flap drawn around the defect.    The area thus outlined was incised deep to adipose tissue with a #15 scalpel blade.  The skin margins were undermined to an appropriate distance in all directions utilizing iris scissors.

## 2023-09-20 NOTE — DISCHARGE NOTE ADULT - NS AS DC AMI YN
Jamie Valdivia  1940  ORDERS:  - discontinue acetaminophen   - acetaminophen (TYLENOL) 500 MG tablet; Take 2 tablets (1,000 mg) by mouth 3 times daily as needed for mild pain  dx pain  Electronically signed by:   MARICHUY Basilio CNP  09/20/23 1:06 PM     no
